# Patient Record
Sex: FEMALE | Race: WHITE
[De-identification: names, ages, dates, MRNs, and addresses within clinical notes are randomized per-mention and may not be internally consistent; named-entity substitution may affect disease eponyms.]

---

## 2017-02-20 ENCOUNTER — HOSPITAL ENCOUNTER (OUTPATIENT)
Dept: HOSPITAL 45 - C.RDSM | Age: 52
Discharge: HOME | End: 2017-02-20
Attending: ORTHOPAEDIC SURGERY
Payer: OTHER GOVERNMENT

## 2017-02-20 DIAGNOSIS — R52: Primary | ICD-10-CM

## 2017-02-20 NOTE — DIAGNOSTIC IMAGING REPORT
RIGHT HAND MIN 3 VIEWS



CLINICAL HISTORY: Right hand pain.     



COMPARISON: None.



DISCUSSION: The bony mineralization appears normal. No fractures are visualized.

There are tiny periarticular calcifications adjacent to the medial aspect of the

distal interphalangeal joint of the index finger. There is a tiny erosion

involving the base of the distal phalanx of the index finger.



IMPRESSION: 

1. No acute fractures

2. Tiny periarticular calcifications at the level the distal interphalangeal

joint of the index finger.







Electronically signed by:  Isaac Bryant M.D.

2/20/2017 11:06 AM



Dictated Date/Time:  2/20/2017 11:05 AM

## 2017-02-21 NOTE — DIAGNOSTIC IMAGING REPORT
LEFT HAND MIN 3 VIEWS



CLINICAL HISTORY: Left hand pain     



COMPARISON: None.



DISCUSSION: 3 views reveal normal mineralization for age. No fractures or

dislocations are visualized. There are no erosive or destructive changes.

Minimal degenerative changes are evident.    



IMPRESSION: Minimal degenerative change. Normal study for age.







Electronically signed by:  Isaac Bryant M.D.

2/21/2017 1:13 PM



Dictated Date/Time:  2/21/2017 1:12 PM

## 2017-03-21 ENCOUNTER — HOSPITAL ENCOUNTER (OUTPATIENT)
Dept: HOSPITAL 45 - X.SURG | Age: 52
Discharge: HOME | End: 2017-03-21
Attending: ORTHOPAEDIC SURGERY
Payer: OTHER GOVERNMENT

## 2017-03-21 VITALS
BODY MASS INDEX: 30.38 KG/M2 | BODY MASS INDEX: 30.38 KG/M2 | HEIGHT: 67.99 IN | WEIGHT: 200.42 LBS | WEIGHT: 200.42 LBS | HEIGHT: 67.99 IN

## 2017-03-21 VITALS — SYSTOLIC BLOOD PRESSURE: 123 MMHG | HEART RATE: 67 BPM | DIASTOLIC BLOOD PRESSURE: 80 MMHG | OXYGEN SATURATION: 98 %

## 2017-03-21 VITALS — TEMPERATURE: 97.88 F

## 2017-03-21 DIAGNOSIS — Z88.0: ICD-10-CM

## 2017-03-21 DIAGNOSIS — M65.312: Primary | ICD-10-CM

## 2017-03-21 NOTE — ANESTHESIA PROGRESS NT - [facility]
Anesthesia Post Op Note


Date & Time


Mar 21, 2017 at 08:07





Vital Signs


Pain Intensity:  4





 Vital Signs Past 12 Hours








  Date Time  Temp Pulse Resp B/P Pulse Ox O2 Delivery O2 Flow Rate FiO2


 


3/21/17 06:37 36.8 79 18 138/90 98 Room Air  











Notes


Mental Status:  alert / awake / arousable, participated in evaluation


Pt Amnestic to Procedure:  Yes


Nausea / Vomiting:  adequately controlled


Pain:  adequately controlled


Airway Patency, RR, SpO2:  stable & adequate


BP & HR:  stable & adequate


Hydration State:  stable & adequate


Anesthetic Complications:  no major complications apparent

## 2017-03-21 NOTE — DISCHARGE INSTRUCTIONS
Discharge Instructions


Date of Service


Mar 21, 2017.





Visit


Reason for Visit:  Left Trigger Thumb





Discharge


Discharge Diagnosis / Problem:  same





Discharge Goals


Goal(s):  Decrease discomfort, Improve function





Medications


Stopped Medications Name(s):  


na


Restart Stopped Medication(s):


resume all scripts





Activity Recommendations


Activity Limitations:  as noted below


Lifting Limitations:  until after follow-up appointment


Exercise/Sports Limitations:  until after follow-up appointment


May Resume Sexual Activity:  when tolerated


Shower/Bathe:  keep incision dry


Driving or Machine Use:  no limitations





Anesthesia


.





Post Anesthesia Instructions:





If you have had General Anesthesia or IV Sedation:





*  Do not drive today.


*  Resume driving when surgeon permits.


*  Do not make important decisions or sign legal documents today.


*  Call surgeon for:





   1.  Temperature elevations greater than 101 degrees F.


   2.  Uncontrollable pain.


   3.  Excessive bleeding.


   4.  Persistent nausea and vomiting.


   5.  Medication intolerance (nausea, vomiting or rash).





*  For nausea and vomiting use only clear liquids such as: tea, soda, bouillon 

until nausea subsides, then gradually increase diet as tolerated.





*  If you have any concerns or questions, call your surgeon's office.  If 

physician is unavailable and it is an emergency, call 911 or go to the nearest 

emergency room.





.





Instructions / Follow-Up


Instructions / Follow-Up


The following are instructions to follow after minor hand surgery.





ACTIVITY RECOMMENDATIONS:





*  Minimize activity until your first visit after surgery.





*  No excessive walking, jogging, sports or laboring.





*  Return to activity is individualized.  Most patients are able to return to 

everyday activities


   within 2 weeks.





*  Return to sports or intensive labor usually occurs at 1-2 months.





*  DRIVING:  Driving may be resumed when you feel you have adequate pain 

control and 


   use of the hand.





*  BATHING:  You may shower or sponge-bathe immediately after surgery.  The 

dressing


   will need to be covered with a plastic bag or plastic wrap until the 

dressing is changed 


   on the fourth or fifth day after surgery.  Once the dressing has been 

changed on the


   fourth or fifth day after surgery, you may shower and get the incision wet.  





*  Wash with regular soap and water.  





*  Do not bathe (submerge the incision), soak, swim or use a hot tub until the 

incision is 


   completely healed over with normal skin and the doctor has given the OK to 

proceed.





*  There is no need to apply any ointments, powders or salves to your incision.





*  Do not apply alcohol or hydrogen peroxide directly to the incision.  Diluted 

peroxide


   (50:50 mixture with sterile saline) may be used to clean dried blood from 

around the incision


   area.








WORK/SCHOOL:





*  You may return to sedentary work or school when you are feeling comfortable.

  This is usually


   3-7 days after surgery.





*  Expect increased discomfort with increased activity.  Continue to elevate 

and ice the hand as 


   much as possible.








DIET:





*  Resume previous diet.








MEDICATIONS:





*  You will have a prescription for pain medication and an anti-inflammatory 

medication after surgery.


   Use the pain pills for severe pain and the anti-inflammatory for less severe 

pain.





*  Once the pain pills have run out, try to use the anti-inflammatory.  If this 

is not effective then 


   contact the office [phone] for assistance.





*  The pain medication may cause nausea, constipation and sleepiness.  You 

should see how 


   they affect you before driving or similar activity.





*  The anti-inflammatory may cause stomach upset and bleeding.  If this occurs, 

let your doctor 


    know immediately [phone].





*  Some patients may need blood clot prevention.  This can be done with either 

a pill or a simple


   shot.  Your doctor will advise you on when to begin these medications and 

how to take them.





*  Do not take aspirin or other anti-inflammatory products (i.e. Advil or Aleve

) if taking blood 


   thinner medication.





*  Take a stool softener like Colace or a stimulant like Senokot to prevent 

constipation.








SPECIAL CARE INSTRUCTIONS:





ICE:





*  Do not apply ice directly to the skin. 





*  Use a thin dressing or stockinet between the skin and ice bag.  The dressing 

in place 


   after surgery will suffice.





*  Apply ice for 20-30 minutes and repeat every 2-4 hours.  This is especially 

important for 


   the first 3-7 days after surgery.





*  Once the pain improves, use ice as needed.





ELEVATION:





*  Keep your hand elevated at or above the level of your heart as much as 

possible.





*  Expect some increased discomfort and swelling if you allow your hand to hang 

down for


   any length of time.





DRESSING:





*  Your dressing will be changed 4-5 days after surgery by the physical 

therapist or physician's


   assistant.  Leave your dressing intact until this time.





*  You may then change your dressing daily with clean dry gauze or Band-aids 

and a soft wrap


   or stockinet.





*  Always wash your hands prior to touching the incision area.





*  Once the stitches are removed, you may leave the wound open to air or cover 

with a thin bandage.





*  There is no need to apply any ointments, powders or salves to your incision.





*  Expect some bloody drainage for the first few days after surgery.





*  Leave the tape strips in place (if present) for 5-7 days.  





*  The initial dressing after surgery may become soaked with blood or fluid 

which is normal.  


   You may reinforce your dressing with clean, dry gauze as needed.





BRACE:





*  Bracing is generally not needed after routine hand surgery.





THERAPY:





*  Physical therapy may be prescribed after your surgery.





*  For carpal tunnel and trigger digit surgery you may begin moving your 

fingers and wrist


   immediately after surgery as tolerated.





*  Be careful to not overuse.





*  Once the sutures are removed, further range of motion exercises can be 

performed.





*  Hand incisions may be very sensitive for a few months after surgery so avoid 

excessive 


   pressure on the incision.  If necessary, use a padded weightlifters' glove.





*  You may massage the incision with skin cream to make it less sensitive and 

reduce scarring.





*  Hand strength usually returns with normal use.





*  If needed, squeezing a soft sponge or Play-dough may help.





*  Your doctor will recommend physical therapy if necessary.








PROBLEMS/QUESTIONS:





*  If you have any problems such as severe pain, numbness, tingling or high 

fevers or if you 


   have any questions, please contact the office at [phone].





*  It is not uncommon to have some numbness and tingling after the surgery 

especially if you


   have had a nerve block done.  This should gradually improve over the first 1-

2 days.  If this 


   persists longer or worsens then contact the office.








FOLLOW UP VISIT:





*  If not already scheduled, please call the office at [phone] to 

schedule follow-up


   appointments for approximately 10 days, 6 weeks and 3 months after surgery.





Diet Recommendations


Recommended Home Diet:  resume previous diet





Procedures


Procedures Performed:  


left trigger release





Pending Studies


Studies pending at discharge:  no





Medical Emergencies








.


Who to Call and When:





Medical Emergencies:  If at any time you feel your situation is an emergency, 

please call 911 immediately.





.





Non-Emergent Contact


Non-Emergency issues call your:  Specialist


Call Non-Emergent contact if:  temperature is above 101.5





.


.





"Provider Documentation" section prepared by Cameron Martinez.

## 2017-03-21 NOTE — OPERATIVE REPORT
DATE OF OPERATION:  03/21/2017

 

SURGEON:  Dr. Cameron Martinez.

 

ASSISTANT:  Zachariah López PA-C.  No resident or fellow available.

 

PREOPERATIVE DIAGNOSIS:  Left trigger thumb.

 

POSTOPERATIVE DIAGNOSIS:  Same.

 

OPERATION PERFORMED:  Left trigger thumb release.

 

PERIOPERATIVE SITUATION:  Medically cleared female with intractable thumb

triggering bilaterally, left and right, wants to proceed with a left first.

 

OPERATION:  The patient was appropriately identified, site verified, consent

verified, and 2 grams of Ancef confirmed as being given.  The left upper

extremity was injected with 3 mL of 0.5% Marcaine plain and 3 mL of 2% plain

lidocaine in the area of the MCP flexion crease of the left thumb.  The arm

was then prepped and draped in the usual routine fashion with a forearm

tourniquet.  The tourniquet was inflated to 250 mmHg after exsanguination of

limb with a rubber Esmarch bandage for a total of 10 minutes.  A curvilinear

incision was made based over the MCP joint.  Full thickness flaps raised. 

Care was taken to protect the digital nerves, which were identified, but not

dissected cleanly.  The area of the mass of the flexor cyst was identified. 

This area was then dissected with the [place] elevator and fully identified

from proximal to distal and then released.  There was some fraying of the

tendon, which was debrided.  The floor of the canal of the flexor sheath had

no masses.  The patient was then asked to move her finger through a full

range of motion and there was no catching.  The wound was then irrigated and

closed with horizontal 3-0 nylon mattress sutures, Dermabond and a light

dressing.  The patient was then transferred to [facility] area in satisfactory

condition having tolerated the procedure well.

 

ESTIMATED BLOOD LOSS:  Trace.

 

CRYSTALLOID:  Per anesthesia report.

 

 

I attest to the content of the Intraoperative Record and any orders documented 
therein. Any exceptions are noted below.

 

 

 

MTDD

## 2017-03-21 NOTE — OPERATIVE REPORT
PREOPERATIVE DIAGNOSIS:  Left thumb trigger digit.

 

POSTOPERATIVE DIAGNOSIS:  Left thumb same.

 

PROCEDURE:  Left thumb open trigger digit release.

 

SURGEON:  Dr. Martinez.  

 

ASSISTANT:  Zachariah López PA-C.

 

HISTORY OF PRESENT ILLNESS:  This 52-year-old white female presented to the

office with complaints of triggering and pain in her left thumb.  She had

tried conservative care measures without success.  She elected to proceed

with surgical intervention after being educated about potential risks and

outcomes.

 

OPERATION:  The patient was taken to the operating room where she was given

local anesthetic and sedation.  She was prepped and draped in the usual

sterile fashion.  Please see Dr. Martinez's operative report for

specifics of the procedure.  I was present for the entire case from initial

patient positioning through final wound closure.  Assistance was provided in

tissue retraction, hemostasis, and final wound closure.  The patient was

taken to phase 2 recovery in satisfactory condition.

 

 

 

CORIE

## 2017-03-21 NOTE — [facility] POST OPERATIVE BRIEF NOTE
Immediate Operative Summary


Operative Date


Mar 21, 2017.





Pre-Operative Diagnosis





Left Trigger Thumb





Post-Operative Diagnosis





Same





Procedure(s) Performed





Left Thumb Open A1 Pulley Release





Surgeon


Dr Martinez





Assistant Surgeon(s)


TRI López PA-C





Estimated Blood Loss


Trace





Findings


trigger with tendon fraying





Fluids (cc crystalloids)


see anesthesia report





Specimens





None





Drains


none





Anesthesia


local/sedation





Complication(s)


None





Disposition


Recovery Room / PACU

## 2017-05-09 ENCOUNTER — HOSPITAL ENCOUNTER (OUTPATIENT)
Dept: HOSPITAL 45 - X.SURG | Age: 52
Discharge: HOME | End: 2017-05-09
Attending: ORTHOPAEDIC SURGERY
Payer: OTHER GOVERNMENT

## 2017-05-09 VITALS
WEIGHT: 200.42 LBS | BODY MASS INDEX: 30.38 KG/M2 | HEIGHT: 67.99 IN | HEIGHT: 67.99 IN | BODY MASS INDEX: 30.38 KG/M2 | WEIGHT: 200.42 LBS

## 2017-05-09 VITALS — TEMPERATURE: 97.88 F

## 2017-05-09 VITALS — SYSTOLIC BLOOD PRESSURE: 133 MMHG | OXYGEN SATURATION: 100 % | DIASTOLIC BLOOD PRESSURE: 82 MMHG | HEART RATE: 70 BPM

## 2017-05-09 DIAGNOSIS — E66.9: ICD-10-CM

## 2017-05-09 DIAGNOSIS — E78.00: ICD-10-CM

## 2017-05-09 DIAGNOSIS — M19.90: ICD-10-CM

## 2017-05-09 DIAGNOSIS — M65.311: Primary | ICD-10-CM

## 2017-05-09 NOTE — OPERATIVE REPORT
DATE OF OPERATION:  05/09/2017

 

SURGEON:  Dr. Martinez.

 

ASSISTANT:  Zachariah López PA-C.  No resident or fellow available.

 

PREOPERATIVE DIAGNOSIS:  Trigger thumb right.

 

POSTOPERATIVE DIAGNOSIS:  Same.

 

OPERATION PERFORMED:  Right trigger thumb release.

 

ANESTHESIA:  Local by surgeon; sedation by anesthesia.

 

PERIOPERATIVE SITUATION:  Medically cleared female with intractable thumb

locking and catching.  Has some degenerative disease throughout the hand. 

This will not address that.  She understands this is just for her trigger

thumb.

 

PROCEDURE:  The patient appropriately identified, site verified, consent

verified, 2 grams of Ancef confirmed as being given.  The area was injected

with 4 mL of 0.5% plain Marcaine and 4 mL of 2% plain lidocaine.  The arm was

then prepped in usual routine fashion.  Tourniquet inflated to 250 mmHg after

exsanguination of limb with a rubber Esmarch bandage for a total of

approximately 10 minutes.  An incision was then made based at the MCP joint. 

Flaps raised.  Care taken to protect the digital nerves to the thumb.  The

flexor mechanism identified and the pulley identified proximally.  It was

nicked and then split with the scissors up into the most proximal aspect of

the oblique pulley.  The tendon was then noted to have some fraying; this was

debrided, it was very superficial.  Tendon had good continuity.  Patient had

good active extension and flexion of the thumb under command.  The wound was

then irrigated and then closed with horizontal and simple mattress 4-0 nylon

suture, Dermabond and a light dressing.

 

ESTIMATED BLOOD LOSS:  Trace.

 

CRYSTALLOID:  Per anesthesia.  

 

No DVT prophylaxis required.

 

 

I attest to the content of the Intraoperative Record and any orders documented therein. Any exceptio
ns are noted below.

## 2017-05-09 NOTE — OPERATIVE REPORT
PREOPERATIVE DIAGNOSIS:  Right trigger thumb.

 

POSTOPERATIVE DIAGNOSIS:  Right thumb same.

 

PROCEDURE:  Right open trigger thumb release.

 

SURGEON:  Dr. Martinez.

 

ASSISTANT:  Zachariah López PA-C.

 

HISTORY OF PRESENT ILLNESS:  This 52-year-old white female presented to the

office with complaints of right thumb triggering, locking, and catching.  She

had tried conservative care measures without success.  She elected to proceed

with surgical intervention after being educated about potential risks and

outcomes.  She previously had a successful trigger thumb release on the left.

 

OPERATION:  The patient was taken to the operating room where she was given

local anesthetic and sedation.  She was prepped and draped in the usual

sterile fashion.  Please see Dr. Martinez's operative report for

specifics of the procedure.  I was present for the entire case from initial

patient positioning through final wound closure.  Assistance was provided in

patient position, tissue retraction, final wound closure.  The patient was

taken to the recovery room in satisfactory condition.

## 2017-05-09 NOTE — DISCHARGE INSTRUCTIONS
Discharge Instructions


Date of Service


May 9, 2017.





Visit


Reason for Visit:  Right Trigger Thumb





Discharge


Discharge Diagnosis / Problem:  same





Discharge Goals


Goal(s):  Decrease discomfort, Improve function





Medications


Stopped Medications Name(s):  


na


Restart Stopped Medication(s):


resume all meds as directed by scripts





Activity Recommendations


Activity Limitations:  as noted below


Lifting Limitations:  until after follow-up appointment


Exercise/Sports Limitations:  until after follow-up appointment


May Resume Sexual Activity:  when tolerated


Shower/Bathe:  keep incision dry


Driving or Machine Use:  no limitations





Anesthesia


.





Post Anesthesia Instructions:





If you have had General Anesthesia or IV Sedation:





*  Do not drive today.


*  Resume driving when surgeon permits.


*  Do not make important decisions or sign legal documents today.


*  Call surgeon for:





   1.  Temperature elevations greater than 101 degrees F.


   2.  Uncontrollable pain.


   3.  Excessive bleeding.


   4.  Persistent nausea and vomiting.


   5.  Medication intolerance (nausea, vomiting or rash).





*  For nausea and vomiting use only clear liquids such as: tea, soda, bouillon 

until nausea subsides, then gradually increase diet as tolerated.





*  If you have any concerns or questions, call your surgeon's office.  If 

physician is unavailable and it is an emergency, call 911 or go to the nearest 

emergency room.





.





Diet Recommendations


Recommended Home Diet:  resume previous diet





Procedures


Procedures Performed:  


right trigger thumb release





Pending Studies


Studies pending at discharge:  no





Medical Emergencies








.


Who to Call and When:





Medical Emergencies:  If at any time you feel your situation is an emergency, 

please call 911 immediately.





.





Non-Emergent Contact


Non-Emergency issues call your:  Specialist


Call Non-Emergent contact if:  temperature is above 101.5





.


.





"Provider Documentation" section prepared by Cameron Martinez.








.

## 2017-05-09 NOTE — [facility] POST OPERATIVE BRIEF NOTE
Immediate Operative Summary


Operative Date


May 9, 2017.





Pre-Operative Diagnosis





Right Trigger Thumb





Post-Operative Diagnosis





Same





Procedure(s) Performed





Right Trigger Thumb Release





Surgeon


Dr. Martinez





Assistant Surgeon(s)


TRI López PA-C





Estimated Blood Loss


Trace





Findings


trigger/tendon fraying





Fluids (cc crystalloids)


see anesthesia report





Specimens





None





Drains


none





Anesthesia


local/sedation





Complication(s)


None





Disposition


Recovery Room / PACU

## 2017-05-09 NOTE — ANESTHESIA PROGRESS NT - [facility]
Anesthesia Post Op Note


Date & Time


May 9, 2017 at 07:47





Vital Signs


Pain Intensity:  0





 Vital Signs Past 12 Hours








  Date Time  Temp Pulse Resp B/P Pulse Ox O2 Delivery O2 Flow Rate FiO2


 


5/9/17 07:22 36.6 75 18 109/71 96 Room Air  


 


5/9/17 06:27 37.1 79 20 134/87 97 Room Air  











Notes


Mental Status:  alert / awake / arousable, participated in evaluation


Pt Amnestic to Procedure:  Yes


Nausea / Vomiting:  adequately controlled


Pain:  adequately controlled


Airway Patency, RR, SpO2:  stable & adequate


BP & HR:  stable & adequate


Hydration State:  stable & adequate


Anesthetic Complications:  no major complications apparent

## 2017-06-05 ENCOUNTER — HOSPITAL ENCOUNTER (OUTPATIENT)
Dept: HOSPITAL 45 - C.OPENMRI | Age: 52
Discharge: HOME | End: 2017-06-05
Attending: ORTHOPAEDIC SURGERY
Payer: COMMERCIAL

## 2017-06-05 DIAGNOSIS — R60.0: ICD-10-CM

## 2017-06-05 DIAGNOSIS — R93.7: ICD-10-CM

## 2017-06-05 DIAGNOSIS — M25.462: ICD-10-CM

## 2017-06-05 DIAGNOSIS — M17.12: Primary | ICD-10-CM

## 2017-06-05 NOTE — DIAGNOSTIC IMAGING REPORT
MRI OF THE  LEFT KNEE



CLINICAL HISTORY:  Chronic left knee pain. Osteoarthritis.



COMPARISON STUDY: Radiographs of the left knee dated 12/19/2016.



TECHNIQUE: MRI of the left knee was performed utilizing proton density, T1, and

T2-weighted sequences in the axial, sagittal, coronal planes. IV contrast was

not administered for this examination. The examination is significantly degraded

by open MRI technique.



FINDINGS:



Menisci: There is increased signal identified within the posterior horn of both

the medial and lateral menisci, greatest in the medial meniscus. This does not

clearly extend to the articular surface, and this could represent mucoid

degeneration versus intrasubstance tear.



Ligaments: The anterior and posterior cruciate ligaments are intact. The medial

and lateral collateral ligaments are within normal limits.



Extensor mechanism: The extensor mechanism is intact. Hoffa's fat pad is normal

in appearance. There is increased signal within the suprapatellar fat pad, best

seen on sagittal T2 fat-sat image #13.



Articular cartilage and bone: There is mild chondromalacia patella, with

approximately 50% thinning of the articular cartilage at the patellar apex and

along the lateral facet. Foci of greater than 50% fissuring are observed. No

full-thickness cartilage loss is seen and there is no reactive marrow edema.

There is approximately 50% thinning of the articular cartilage along the

weightbearing surface in the medial compartment. Particular cartilage of the

lateral compartment appears maintained. There are tiny marginal osteophytes.

There is no MRI evidence of fracture.



Joint effusion: There is a moderate to large joint effusion.



Soft tissues: Prepatellar soft tissue edema is noted. The musculature

surrounding the knee joint is normal in bulk and signal intensity.





IMPRESSION: 



1. Moderate to large joint effusion.



2. Mild chondromalacia patella. There is also mild degenerative thinning of the

articular cartilage along the weightbearing surface in the medial compartment.

No full-thickness cartilage loss or reactive marrow edema is identified.



3. There is increased signal identified within the posterior horns of the

menisci, medial greater than lateral. This does not clearly extend to the

articular surface, and could represent mucoid degeneration versus intrasubstance

tearing. 



4. There is nonspecific edema within the suprapatellar fat pad. This could be

seen in setting of the quadriceps fat pad impingement syndrome. Clinical

correlation will be required. 



5. Prepatellar soft tissue edema.



6. The collateral ligaments and the cruciate ligaments are maintained.







Electronically signed by:  Armando Singleton M.D.

6/5/2017 10:12 AM



Dictated Date/Time:  6/5/2017 10:03 AM

## 2017-08-01 ENCOUNTER — HOSPITAL ENCOUNTER (OUTPATIENT)
Dept: HOSPITAL 45 - X.SURG | Age: 52
Discharge: HOME | End: 2017-08-01
Attending: ORTHOPAEDIC SURGERY
Payer: COMMERCIAL

## 2017-08-01 VITALS — DIASTOLIC BLOOD PRESSURE: 89 MMHG | OXYGEN SATURATION: 100 % | SYSTOLIC BLOOD PRESSURE: 147 MMHG | HEART RATE: 70 BPM

## 2017-08-01 VITALS
BODY MASS INDEX: 30.67 KG/M2 | HEIGHT: 67.01 IN | BODY MASS INDEX: 30.67 KG/M2 | WEIGHT: 195.42 LBS | WEIGHT: 195.42 LBS | HEIGHT: 67.01 IN

## 2017-08-01 VITALS — TEMPERATURE: 97.88 F

## 2017-08-01 DIAGNOSIS — E78.00: ICD-10-CM

## 2017-08-01 DIAGNOSIS — Z87.891: ICD-10-CM

## 2017-08-01 DIAGNOSIS — M25.362: ICD-10-CM

## 2017-08-01 DIAGNOSIS — F41.9: ICD-10-CM

## 2017-08-01 DIAGNOSIS — M65.862: ICD-10-CM

## 2017-08-01 DIAGNOSIS — Z79.899: ICD-10-CM

## 2017-08-01 DIAGNOSIS — E66.9: ICD-10-CM

## 2017-08-01 DIAGNOSIS — M17.12: Primary | ICD-10-CM

## 2017-08-01 NOTE — [facility] POST OPERATIVE BRIEF NOTE
Immediate Operative Summary


Operative Date


Aug 1, 2017.





Pre-Operative Diagnosis





Left Knee Degenerative Medial Meniscus Tear





Post-Operative Diagnosis





djd PFJ/loose body/medial compartment djd





Procedure(s) Performed





EUA,Left Knee Arthroscopy,  Debridement PFL removal loose body





Surgeon


Dr Martinez





Assistant Surgeon(s)


TRI López PA-C





Estimated Blood Loss


Trace





Findings


see op note





Fluids (cc crystalloids)


600cc





Specimens





None





Drains


none





Anesthesia


LMA/IA block





Complication(s)


None





Disposition


Recovery Room / PACU

## 2017-08-01 NOTE — HISTORY & PHYSICAL BRIDGE NOTE
H&P Re-Evaluation


Bridge Note:


I have examined the patient, reviewed the History & Physical and in the 

interval since the performance of the History & Physical I have noted the 

following changes of clinical significance: consent obtained,No changes noted

## 2017-08-01 NOTE — [facility] OPERATIVE REPORT
Operative Report


Operative Date


Aug 1, 2017.





Pre-Operative Diagnosis





Left Knee Degenerative Medial Meniscus Tear





Post-Operative Diagnosis





djd PFJ/loose body/medial compartment djd





Procedure(s) Performed





EUA,Left Knee Arthroscopy,  Debridement PFJ and medial compartment,


removal loose body





Surgeon


Dr Martinez





Assistant Surgeon(s)


TRI López PA-C





Estimated Blood Loss


Trace





Findings


Degenerative joint disease, loose body





Fluids (cc crystalloids)


600cc





Specimens





None





Drains


none





Complication(s)


None





Indications


This 52-year-old white female presented the office with complaints of left knee 

pain that was affecting her ADLs.  She elected to proceed with surgical 

intervention after being educated about potential risks and outcomes.  

Preoperative MRI was obtained.





Description of Procedure


Patient was taken to the operating room where she was given general anesthesia.

  She was prepped and draped in usual sterile fashion.  Please see Dr. Martinez's operative report for specifics of the procedure.  I was present 

for the entire case from initial patient positioning through final wound 

closure.  Assistance was provided in patient positioning, arthroscopy, and 

final wound closure.  Patient was taken to the recovery room in satisfactory 

condition.


I attest to the content of the Intraoperative Record and any orders documented 

therein.  Any exceptions are noted below.

## 2017-08-01 NOTE — DISCHARGE INSTRUCTIONS
Discharge Instructions


Date of Service


Aug 1, 2017.





Visit


Reason for Visit:  Left Knee Degenerative Medial Meniscus Tear





Discharge


Discharge Diagnosis / Problem:  same





Discharge Goals


Goal(s):  Decrease discomfort, Improve function





Medications


Stopped Medications Name(s):  


na


Restart Stopped Medication(s):


use all med scripts as directed





Activity Recommendations


Activity Limitations:  as noted below


Lifting Limitations:  gradually increase as tolerated


Exercise/Sports Limitations:  until after follow-up appointment


May Resume Sexual Activity:  when tolerated


Shower/Bathe:  keep incision dry


Driving or Machine Use:  resume 1 day after discharge


Weightbearing Status:  Left weightbearing (as tolerated)





Anesthesia


.





Post Anesthesia Instructions:





If you have had General Anesthesia or IV Sedation:





*  Do not drive today.


*  Resume driving when surgeon permits.


*  Do not make important decisions or sign legal documents today.


*  Call surgeon for:





   1.  Temperature elevations greater than 101 degrees F.


   2.  Uncontrollable pain.


   3.  Excessive bleeding.


   4.  Persistent nausea and vomiting.


   5.  Medication intolerance (nausea, vomiting or rash).





*  For nausea and vomiting use only clear liquids such as: tea, soda, bouillon 

until nausea subsides, then gradually increase diet as tolerated.





*  If you have any concerns or questions, call your surgeon's office.  If 

physician is unavailable and it is an emergency, call 911 or go to the nearest 

emergency room.





.





Instructions / Follow-Up


Instructions / Follow-Up


The following are instructions to follow after  your Arthroscopic Knee Surgery.





ACTIVITY RECOMMENDATIONS:





*  Minimize activity until your first visit after surgery.





*  No excessive walking, jogging, sports or laboring.





*  Return to activity is individualized.  Most patients are able to return to 

every day activities


   within one month.





*  Return to sports or intensive labor usually occurs at 2-3 months.





*  Driving is not permitted until at least your first postoperative visit at a 

minimum.  Please 


   ask your doctor when it is safe to resume driving.  If you have an automatic 

vehicle and 


   your left leg has been operated on, then you may begin driving as soon as 

you are 


   comfortable and can drive safely.








SCHOOL/WORK RECOMMENDATIONS:





*  You may return to sedentary work or school when you are feeling more 

comfortable.  This


    is usually 3-7 days after surgery.  





*  Expect increased discomfort with increased activity.  Continue to elevate 

and ice the leg 


   as much as possible.








MEDICATIONS:





*  You will have a prescription for pain medication and an anti-inflammatory 

medication after surgery.





*  Use the pain medication for severe pain and the anti-inflammatory for less 

severe pain.  Once the 


   pain medication has run out, try to use the anti-inflammatory medication.  

If this is not effective, 


   contact the office [phone] for assistance.





*  The pain medication may cause nausea, constipation and drowsiness.  You 

should see how they 


   affect you before driving or similar activity.





*  The anti-inflammatory medication may cause stomach upset and bleeding.  If 

this occurs let your 


   doctor know immediately [phone].  





*  Take a stool softener like Colace or a laxative like Senokot to prevent 

constipation.








DIET:





*  Resume previous diet.








SPECIAL CARE:





ICE:  You have the option of an ice cooler, gel packs or ice bags. 





*   If you have an ice cooler, refer to the instructions for that device.  The 

ice cooler may 


    be used continuously.





*  If you do not have an ice cooler, you will need to use ice bags or gel 

packs.  Do not 


   apply ice directly to the skin.  Use a thin dressing or jeremy shirt between 

the skin and ice


   bag.  Apply ice for 20-30 minutes and repeat every 2-4 hours.  This is 

especially important


   for the first 7-10 days after surgery.  Once the pain improves, use ice as 

needed.  





ELEVATION:





*  Keep your leg elevated at or above the level of your heart as much as 

possible.





*  Expect some increased discomfort and swelling if you are standing for any 

length of time.





*  When lying down, avoid placing anything under your knee.  Rather, prop your 

leg up by placing


   several pillows under your heel or calf.





DRESSING:





*  Your dressing will be changed at your first therapy appointment 

approximately 4-5 days after 


   surgery.  Band-aids, tape strips or gauze may be applied.  You may then 

change your dressing


   daily.





*  Reapply dressing followed by the Ace wrap or Tubi- stockinet and EBIce 

cooling pad (if chosen).  





*  Always wash your hands prior to touching the incision area.





*  Once the stitches are removed, you may leave the wound open to air or cover 

with an Ace wrap 


   or Tubi- stockinet.





*  If you have been given a white elastic stocking (JAN hose), wear as much as 

possible for the first


   1-3 weeks depending on swelling.





*  Expect some bloody drainage for the first few days after surgery.





*  Leave the tape strips, if present, in place for 5-7 days.





*  Band-aids and gauze may be changed daily.





CRUTCHES:





*  You will need to use crutches after surgery.





*  You may gradually progress to full weight bearing as tolerated and wean off 

the crutches


   unless otherwise advised.





*  Your therapist can provide assistance weaning off crutches.





*  Patients who have a microfracture done may need to be toe-touch weight-

bearing for 


   4-6 weeks.





BATHING:





*  You may shower or sponge-bathe immediately after surgery.  





*  The dressing will need to be covered with a plastic bag or plastic wrap 

until the dressing


    is changed on the fourth or fifth day after surgery. 





*  Once the dressing has been changed on the fourth or fifth day after surgery, 

you may 


   shower and get the incision wet.





*  Wash with regular soap and water.  





*  Do not bathe (submerge the incision), soak, swim or use a hot tub until the 

incision is 


   completely healed over with normal skin and the doctor has given the OK to 

proceed.





*  There is no need to apply any ointments, powders or salves to your incision.





*  Do not apply alcohol or hydrogen peroxide directly to the incision.





*  Diluted peroxide (50:50 mixture with sterile saline) may be used to clean 

dried blood from


   around the incision area.





BRACE:





*  Bracing is generally not needed after routine Arthroscopic Knee surgery.





THERAPY:





*  You will begin therapy four or five days after surgery.  





*  Organized therapy with the therapist is important for the first 4-6 weeks 

after surgery.  


   During that time you will attend therapy 1-3 times per week.  





*  You will also need to do daily exercises for range of motion and strength as 

instructed.








PROBLEMS/QUESTIONS:





*  If you have any problems such as severe pain, numbness, tingling or high 

fevers or if you 


   have any questions, please contact the office at [phone].





*  It is not uncommon to have some numbness and tingling after the surgery 

especially if you


   have had a nerve block done.  This should gradually improve over the first 1-

2 days.  If this 


   persists longer or worsens please contact the office.








FOLLOW UP VISIT:





*  If not already scheduled, please call the office at [phone] to 

schedule a follow-up


   appointment for 10 days, 6 weeks and 3 months after surgery.





Diet Recommendations


Recommended Home Diet:  resume previous diet





Procedures


Procedures Performed:  


left knee scope debridement





Pending Studies


Studies pending at discharge:  no





Medical Emergencies








.


Who to Call and When:





Medical Emergencies:  If at any time you feel your situation is an emergency, 

please call 911 immediately.





.





Non-Emergent Contact


Non-Emergency issues call your:  Specialist


Call Non-Emergent contact if:  you have a fever, temperature is above 101.5, 

your pain is not controlled, wound has increased drainage, wound has increased 

redness, wound has increased pain, you have any medication questions





.


.








"Provider Documentation" section prepared by Cameron Martinez.








.

## 2017-08-01 NOTE — OPERATIVE REPORT
DATE OF OPERATION:  08/01/2017

 

SURGEON:  Dr. Martinez.

 

ASSISTANT:  Zachariah López PA-C.  No resident or fellow available.

 

PREOPERATIVE DIAGNOSIS:  Degenerative disease, left knee with possible

meniscus tear.

 

POSTOPERATIVE DIAGNOSIS:

1.  Degenerative patellofemoral joint with large articular flap unstable.

2.  Loose body from same site and medial compartment.

3.  Extensive synovitis.

4.  Grade 3 degenerative disease, medial compartment.

 

OPERATION PERFORMED:

1.  Exam under anesthesia.

2.  Diagnostic arthroscopy.

3.  Arthroscopic debridement of loose body, medial compartment.

4.  Incidental chondroplasty medial compartment.

5.  Extensive debridement with loose articular flap of trochlea and

patellofemoral joint synovial impingement grade 4 changes there.  

6.  Intact lateral compartment.

 

PERIOPERATIVE SITUATION:  Medically cleared female with intractable knee pain

has failed conservative management.  She was advised that this is likely an

extended degenerative process.  She states that she feels like something is

catching and locking and wants to proceed with surgical treatment.  Does not

wish to have the knee replacement.  Advised that this would not be a curative

procedure and that mechanical symptoms are eliminated and she is happy with

that.  That would be the most that we could be expected.

 

PROCEDURE:  The patient appropriately identified, site verified, consent

verified, 2 grams of Ancef confirmed as being given.  Left lower extremity

was examined revealing no ligamentous instability.  It was then sterilely

injected with 20 mL 0.5% Marcaine and 5 mg of Duramorph for postoperative

pain control.  The knee was then prepped and draped in usual routine fashion,

the inframedial and inferolateral portals identified and the inferolateral

was injected with 3 mL of 0.25% Marcaine with epinephrine as well as the

inframedial portal.  The joint was entered through the anterolateral

inferolateral portal and inspection of the anteromedial surface then allowed

needle localization and the medial portal made.  Immediately encountered was

a loose body in the medial compartment.  This was debrided with a shaver; it

was through articular cartilage.  Medial compartment revealed an intact

meniscus with grade 3 changes of the articular surfaces of the medial

compartment.  These were incidentally debrided.  There was synovial

impingement in both medial and lateral gutters and the patellofemoral joint. 

These were all debrided anteriorly and the osteophyte was developing off the

medial eminence.  The lateral compartment had intact lateral meniscus and

grade 1 changes of the articular surfaces.  The patellofemoral joint had a

very large defect in the trochlea with pseudo-osteonecrotic area with a large

articular flap that was impinging; this was debrided to a stable base.  There

was probably about 1 cm2 area.  This was then viewed from the opposite side

and debrided from the anterolateral portal to ensure the entire debridement

and flap stability was created.  Once this was done, the procedure was

terminated.  All instruments and fluid removed.  The portals closed with 4-0

nylon, dressed with Xeroform, 4 x 4 gauze, sterile Webril, ABD pads and above

knee JAN stocking.  The patient will be full weightbearing.  DVT prophylaxis

with Lovenox starting tomorrow.  Overall prognosis for this knee is guarded. 

High probability of knee replacement sometime in the next year to 2 years.

 

 

I attest to the content of the Intraoperative Record and any orders documented therein. Any exception
s are noted below.

## 2017-08-01 NOTE — ANESTHESIA PROGRESS NT - [facility]
Anesthesia Post Op Note


Date & Time


Aug 1, 2017 at 08:33





Vital Signs


Pain Intensity:  0





Vital Signs Past 12 Hours








  Date Time  Temp Pulse Resp B/P (MAP) Pulse Ox O2 Delivery O2 Flow Rate FiO2


 


8/1/17 08:17 36.6 70 16 150/92 (111) 100 Room Air  


 


8/1/17 08:11    135/84    


 


8/1/17 08:09  72 15  96   


 


8/1/17 08:09 37.2 69 20 135/84 99 Room Air  


 


8/1/17 08:09  73 15     


 


8/1/17 08:06    132/84    


 


8/1/17 08:04  70 15     


 


8/1/17 08:04  70 15  95   


 


8/1/17 08:01    143/87    


 


8/1/17 07:59  80 26     


 


8/1/17 07:59  83 26  100   


 


8/1/17 07:57    145/81    


 


8/1/17 07:54  80 17     


 


8/1/17 07:54  79 17  100   


 


8/1/17 07:51    137/82    


 


8/1/17 07:49  82 24  100   


 


8/1/17 07:49  83 24     


 


8/1/17 07:46    153/84    


 


8/1/17 07:44  84 19  100   


 


8/1/17 07:44  86 19     


 


8/1/17 07:42    151/66    


 


8/1/17 07:40    146/86    


 


8/1/17 07:39 36.8 86 16 146/86 100 Mask 6 


 


8/1/17 06:46 36.9 71 16 151/100 (117) 97 Room Air  











Notes


Mental Status:  alert / awake / arousable, participated in evaluation


Pt Amnestic to Procedure:  Yes


Nausea / Vomiting:  adequately controlled


Pain:  adequately controlled


Airway Patency, RR, SpO2:  stable & adequate


BP & HR:  stable & adequate


Hydration State:  stable & adequate


Anesthetic Complications:  no major complications apparent

## 2017-11-20 ENCOUNTER — HOSPITAL ENCOUNTER (OUTPATIENT)
Dept: HOSPITAL 45 - C.RDSM | Age: 52
Discharge: HOME | End: 2017-11-20
Attending: ORTHOPAEDIC SURGERY
Payer: COMMERCIAL

## 2017-11-20 DIAGNOSIS — M17.12: Primary | ICD-10-CM

## 2018-02-12 LAB
BASOPHILS # BLD: 0.04 K/UL (ref 0–0.2)
BASOPHILS NFR BLD: 0.9 %
BUN SERPL-MCNC: 17 MG/DL (ref 7–18)
CALCIUM SERPL-MCNC: 8.9 MG/DL (ref 8.5–10.1)
CO2 SERPL-SCNC: 29 MMOL/L (ref 21–32)
CREAT SERPL-MCNC: 0.74 MG/DL (ref 0.6–1.2)
EOS ABS #: 0.16 K/UL (ref 0–0.5)
EOSINOPHIL NFR BLD AUTO: 294 K/UL (ref 130–400)
GLUCOSE SERPL-MCNC: 90 MG/DL (ref 70–99)
HCT VFR BLD CALC: 34.2 % (ref 37–47)
HGB BLD-MCNC: 11.5 G/DL (ref 12–16)
IG#: 0.01 K/UL (ref 0–0.02)
IMM GRANULOCYTES NFR BLD AUTO: 29.5 %
INR PPP: 1 (ref 0.9–1.1)
LYMPHOCYTES # BLD: 1.38 K/UL (ref 1.2–3.4)
MCH RBC QN AUTO: 30.4 PG (ref 25–34)
MCHC RBC AUTO-ENTMCNC: 33.6 G/DL (ref 32–36)
MCV RBC AUTO: 90.5 FL (ref 80–100)
MONO ABS #: 0.62 K/UL (ref 0.11–0.59)
MONOCYTES NFR BLD: 13.2 %
NEUT ABS #: 2.47 K/UL (ref 1.4–6.5)
NEUTROPHILS # BLD AUTO: 3.4 %
NEUTROPHILS NFR BLD AUTO: 52.8 %
PMV BLD AUTO: 9.1 FL (ref 7.4–10.4)
POTASSIUM SERPL-SCNC: 4.6 MMOL/L (ref 3.5–5.1)
PTT PATIENT: 25.4 SECONDS (ref 21–31)
RED CELL DISTRIBUTION WIDTH CV: 13.1 % (ref 11.5–14.5)
RED CELL DISTRIBUTION WIDTH SD: 43 FL (ref 36.4–46.3)
SODIUM SERPL-SCNC: 139 MMOL/L (ref 136–145)
WBC # BLD AUTO: 4.68 K/UL (ref 4.8–10.8)

## 2018-02-12 NOTE — DIAGNOSTIC IMAGING REPORT
CHEST 2 VIEWS ROUTINE



CLINICAL HISTORY: 53 years-old Female presenting with preoperative assessment. 



TECHNIQUE: PA and lateral views of the chest were obtained.



COMPARISON: None.



FINDINGS:

Cardiomediastinal silhouette normal. Lungs and pleural spaces clear. Osseous

structures normal. Upper abdomen normal.



IMPRESSION:

1.  No acute cardiopulmonary disease.







Electronically signed by:  Lew Villagomez M.D.

2/12/2018 11:41 AM



Dictated Date/Time:  2/12/2018 11:41 AM

## 2018-02-12 NOTE — PAT MEDICATION INSTRUCTIONS
Service Date


Feb 12, 2018.





Current Home Medication List


Atorvastatin (Lipitor), 1 TAB PO QAM


Bupropion (Wellbutrin Sr), 150 MG PO BID


Duloxetine HCl (Cymbalta), 2 CAP PO QAM


Trazodone Hcl (Trazodone), 1.5 TAB PO HS





Medication Instructions


For Your Scheduled Surgery 








- Take the following medications the morning of surgery with a sip of water:


Duloxetine HCl (Cymbalta), 2 CAP PO QAM


Atorvastatin (Lipitor), 1 TAB PO QAM


Bupropion (Wellbutrin Sr), 150 MG PO BID





- Take the following medications as scheduled the night before surgery:


Trazodone Hcl (Trazodone), 1.5 TAB PO HS


Bupropion (Wellbutrin Sr), 150 MG PO BID








If you have any questions please call us at [phone] or [phone] or 

[phone]

## 2018-02-19 NOTE — HISTORY & PHYSICAL EXAMINATION
DATE OF ADMISSION:  02/27/2018

 

CHIEF COMPLAINT:  Left knee pain.

 

HISTORY OF PRESENT ILLNESS:  This 53-year-old white female presents to the

office with complaints of left knee pain that she has had for over a year and

a half.  Pain has become worse with time.  It is global throughout the knee. 

She does note occasional swelling.  Occasional catching.  No locking or

buckling.  No numbness or tingling.  Pain is worse with activity and does

affect her ADLs.  She previously tried anti-inflammatories as well as topical

anti-inflammatories, viscosupplementation, and previous knee arthroscopy with

debridement without lasting improvement.  She elects to proceed with surgical

intervention in hopes of alleviating her pain.  She does have known

tricompartmental arthritis from her knee arthroscopy performed 08/01/2017. 

Preoperative imaging has been obtained.  She elects to proceed with left

total knee arthroplasty in hopes of alleviating her pain.

 

PAST MEDICAL HISTORY:  Significant for elevated cholesterol, anxiety,

osteoarthritis, chronic low back pain, and obesity.

 

PREVIOUS SURGERIES:  Bilateral trigger thumb releases 2017, tubal ligation in

1993, pneumothorax, 1995 with chest tube placement.  Ovarian cyst excision

2013, left knee arthroscopy with partial medial meniscectomy and debridement

08/01/2017.

 

ALLERGIES:  KNOWN ALLERGY TO PENICILLIN WHICH CAUSED RANDOM JOINT SWELLING

AND RASH.  ALSO, LEVAQUIN, WHICH CAUSED HIVES.  SHE DOES FINE WITH ANCEF AND

KEFLEX.

 

FAMILY HISTORY:  Noncontributory.

 

CURRENT MEDICATIONS:  Cymbalta unknown dose, Lipitor 10 mg p.o. daily,

trazodone 50 mg p.o. at bedtime, Voltaren topical gel up to 4 times a day,

Wellbutrin daily unknown dose.

 

SOCIAL HISTORY:  The patient is currently unemployed.  Former cook.  No

tobacco use, quit in 2015.  Occasional ETOH use.  .

 

REVIEW OF SYSTEMS:  Significant for above stated conditions, otherwise

unremarkable.

 

PHYSICAL EXAMINATION:

GENERAL:  Well-developed, well-nourished middle aged white female in no acute

distress.  Sitting in a chair.  Alert and oriented.

SKIN:  Warm and dry with good turgor.  No rashes or lesions.  No ecchymosis

or erythema.  Mild intra-articular effusion in the left knee.

HEENT:  Normocephalic, atraumatic.  EYES:  PERRLA, EOMI.  Nares patent

bilaterally without turbinate enlargement.  Oropharynx without erythema or

exudate.  No lesions noted.  Uvula midline.  Oral mucosa moist.  Fair

dentition.

HEART:  RRR.  No MGR.

LUNGS:  Clear to auscultation bilaterally.  No crackles, rhonchi or wheezing.

 Good air movement.

ABDOMEN:  Bowel sounds present x4, soft, nontender.  No organomegaly.  Mild

obesity.

MUSCULOSKELETAL:  Left knee evaluation reveals a lack of around 5 degrees of

terminal extension.  Flexion to greater than 100 degrees.  Strength is 5/5

with fairly good quad tone.  No defect in the patellar tendon or quadriceps

tendon.  There is discomfort with palpation over the medial and lateral joint

lines.  She also has discomfort with palpation over the peripatellar area. 

Ambulatory with a minimally antalgic gait.

NEUROLOGIC:  Gross sensation is intact across the lower extremities by soft

touch.  Peripheral pulses are 2+.  Cranial nerves II-XII are intact.

 

DATA:  Arthroscopic imaging previously obtained shows end-stage disease in

all 3 compartments.  Radiographic imaging shows joint space narrowing and

periarticular osteophytes as well.

 

ASSESSMENT:  Left knee degenerative joint disease.  

 

PLAN:  Postoperative prescriptions for Percocet and Coumadin will be provided

at discharge from the hospital.  She will attend outpatient PT.  She already

has crutches.  Preoperative lab work, EKG, and chest x-ray have been ordered.

 Medical clearance has been obtained from her PCP in [place].  Informed

written consent will be obtained the morning of surgery.

## 2018-02-27 ENCOUNTER — HOSPITAL ENCOUNTER (INPATIENT)
Dept: HOSPITAL 45 - C.ACU | Age: 53
LOS: 1 days | Discharge: HOME | DRG: 470 | End: 2018-02-28
Attending: ORTHOPAEDIC SURGERY | Admitting: ORTHOPAEDIC SURGERY
Payer: COMMERCIAL

## 2018-02-27 VITALS
OXYGEN SATURATION: 98 % | HEART RATE: 73 BPM | DIASTOLIC BLOOD PRESSURE: 95 MMHG | SYSTOLIC BLOOD PRESSURE: 154 MMHG | TEMPERATURE: 98.06 F

## 2018-02-27 VITALS
DIASTOLIC BLOOD PRESSURE: 80 MMHG | SYSTOLIC BLOOD PRESSURE: 129 MMHG | HEART RATE: 70 BPM | OXYGEN SATURATION: 100 % | TEMPERATURE: 98.42 F

## 2018-02-27 VITALS — DIASTOLIC BLOOD PRESSURE: 90 MMHG | HEART RATE: 63 BPM | OXYGEN SATURATION: 100 % | SYSTOLIC BLOOD PRESSURE: 144 MMHG

## 2018-02-27 VITALS — DIASTOLIC BLOOD PRESSURE: 85 MMHG | OXYGEN SATURATION: 100 % | SYSTOLIC BLOOD PRESSURE: 123 MMHG | HEART RATE: 73 BPM

## 2018-02-27 VITALS — DIASTOLIC BLOOD PRESSURE: 66 MMHG | SYSTOLIC BLOOD PRESSURE: 104 MMHG | OXYGEN SATURATION: 100 % | HEART RATE: 78 BPM

## 2018-02-27 VITALS
DIASTOLIC BLOOD PRESSURE: 60 MMHG | SYSTOLIC BLOOD PRESSURE: 98 MMHG | OXYGEN SATURATION: 99 % | TEMPERATURE: 98.24 F | HEART RATE: 77 BPM

## 2018-02-27 VITALS
OXYGEN SATURATION: 97 % | SYSTOLIC BLOOD PRESSURE: 127 MMHG | TEMPERATURE: 98.6 F | HEART RATE: 75 BPM | DIASTOLIC BLOOD PRESSURE: 80 MMHG

## 2018-02-27 VITALS — OXYGEN SATURATION: 100 % | DIASTOLIC BLOOD PRESSURE: 74 MMHG | SYSTOLIC BLOOD PRESSURE: 110 MMHG | HEART RATE: 70 BPM

## 2018-02-27 VITALS
SYSTOLIC BLOOD PRESSURE: 134 MMHG | OXYGEN SATURATION: 100 % | HEART RATE: 75 BPM | TEMPERATURE: 97.88 F | DIASTOLIC BLOOD PRESSURE: 78 MMHG

## 2018-02-27 VITALS
WEIGHT: 205.25 LBS | WEIGHT: 205.25 LBS | BODY MASS INDEX: 32.21 KG/M2 | HEIGHT: 67 IN | BODY MASS INDEX: 32.21 KG/M2 | HEIGHT: 67 IN

## 2018-02-27 DIAGNOSIS — Z87.891: ICD-10-CM

## 2018-02-27 DIAGNOSIS — Z88.1: ICD-10-CM

## 2018-02-27 DIAGNOSIS — F32.9: ICD-10-CM

## 2018-02-27 DIAGNOSIS — M17.12: Primary | ICD-10-CM

## 2018-02-27 DIAGNOSIS — M21.162: ICD-10-CM

## 2018-02-27 DIAGNOSIS — F41.9: ICD-10-CM

## 2018-02-27 DIAGNOSIS — E78.00: ICD-10-CM

## 2018-02-27 DIAGNOSIS — Z88.0: ICD-10-CM

## 2018-02-27 DIAGNOSIS — Z79.899: ICD-10-CM

## 2018-02-27 DIAGNOSIS — E66.9: ICD-10-CM

## 2018-02-27 PROCEDURE — 0SRD0J9 REPLACEMENT OF LEFT KNEE JOINT WITH SYNTHETIC SUBSTITUTE, CEMENTED, OPEN APPROACH: ICD-10-PCS | Performed by: ORTHOPAEDIC SURGERY

## 2018-02-27 RX ADMIN — BUPROPION HYDROCHLORIDE SCH MG: 150 TABLET, EXTENDED RELEASE ORAL at 21:20

## 2018-02-27 RX ADMIN — FERROUS GLUCONATE SCH MG: 324 TABLET ORAL at 18:16

## 2018-02-27 RX ADMIN — DOCUSATE SODIUM SCH MG: 100 CAPSULE, LIQUID FILLED ORAL at 21:20

## 2018-02-27 RX ADMIN — SODIUM CHLORIDE SCH MG: 9 INJECTION INTRAMUSCULAR; INTRAVENOUS; SUBCUTANEOUS at 13:09

## 2018-02-27 RX ADMIN — Medication SCH EA: at 10:33

## 2018-02-27 RX ADMIN — DULOXETINE SCH MG: 60 CAPSULE, DELAYED RELEASE PELLETS ORAL at 09:00

## 2018-02-27 RX ADMIN — OXYCODONE HYDROCHLORIDE PRN MG: 5 TABLET ORAL at 17:09

## 2018-02-27 RX ADMIN — OXYCODONE HYDROCHLORIDE PRN MG: 5 TABLET ORAL at 21:19

## 2018-02-27 RX ADMIN — OXYCODONE HYDROCHLORIDE PRN MG: 5 TABLET ORAL at 12:57

## 2018-02-27 RX ADMIN — SODIUM CHLORIDE SCH MG: 9 INJECTION INTRAMUSCULAR; INTRAVENOUS; SUBCUTANEOUS at 23:39

## 2018-02-27 RX ADMIN — ATORVASTATIN CALCIUM SCH MG: 10 TABLET, FILM COATED ORAL at 09:00

## 2018-02-27 RX ADMIN — SODIUM CHLORIDE SCH MG: 9 INJECTION INTRAMUSCULAR; INTRAVENOUS; SUBCUTANEOUS at 18:17

## 2018-02-27 RX ADMIN — FERROUS GLUCONATE SCH MG: 324 TABLET ORAL at 13:01

## 2018-02-27 NOTE — [facility] OPERATIVE REPORT
Operative Report


Operative Date


Feb 27, 2018.





Pre-Operative Diagnosis





Left Knee Degenerative Joint Disease





Post-Operative Diagnosis





Left Knee Degenerative Joint Disease





Procedure(s) Performed





Left Total Knee Arthroplasty





Surgeon


Dr. Martinez





Assistant Surgeon(s)


JEFFREY Bowers





Estimated Blood Loss


75 ml





Findings


severe medial disease and trochlear disease left knee





Fluids


2000cc





Specimens





A. Left Knee Bone and Tissue





Drains


None





Anesthesia Type


MAC Spinal Regional





Complication(s)


none





Disposition


no


Recovery Room / PACU





Indications


This 53 year old white female presented the office with complaints of 

intractable left knee pain.  She had tried conservative care measures including 

oral pain medication, viscous supplementation, cortisone injections, and 

activity modification without improvement.  He previously had left knee 

arthroscopy with debridement in August 2017 that showed considerable arthritic 

disease.  She she elected to proceed with surgical intervention in hopes of 

alleviating her discomfort.  Preoperative imaging was obtained.





Description of Procedure


Patient was administered a spinal anesthetic and then taken to the operating 

room where she was given sedation.  She was prepped and draped in usual sterile 

fashion.  Please see Dr. Martinez's operative report for specifics of the 

procedure.  I was present for the entire case from initial patient positioning 

through final wound closure.  Assistance was provided in tissue traction, 

hemostasis, trial implant placement, final implant placement, and final wound 

closure.  Patient was taken to the recovery room in satisfactory condition.


I attest to the content of the Intraoperative Record and any orders documented 

therein.  Any exceptions are noted below.

## 2018-02-27 NOTE — ANESTHESIOLOGY PROGRESS NOTE
Anesthesia Post Op Note


Date & Time


Feb 27, 2018 at 09:17





Vital Signs


Pain Intensity:  0





Vital Signs Past 12 Hours








  Date Time  Temp Pulse Resp B/P (MAP) Pulse Ox O2 Delivery O2 Flow Rate FiO2


 


2/27/18 09:12  65 14  100   


 


2/27/18 09:12 36.7 65 14     


 


2/27/18 09:11    140/88    


 


2/27/18 09:07  68 12  100   


 


2/27/18 09:07  68 12     


 


2/27/18 09:06    132/89    


 


2/27/18 09:02  67 10     


 


2/27/18 09:02  66 10 138/87 100   


 


2/27/18 08:57  64 14  100   


 


2/27/18 08:57  63 14     


 


2/27/18 08:56    146/89    


 


2/27/18 08:52  67 15     


 


2/27/18 08:52  67 15  100   


 


2/27/18 08:51    140/90    


 


2/27/18 08:50      Nasal Cannula 3 


 


2/27/18 08:47  68 13     


 


2/27/18 08:47  67 13  100   


 


2/27/18 08:46    133/88    


 


2/27/18 08:42  73 13  100   


 


2/27/18 08:42  74 13     


 


2/27/18 08:41    137/80    


 


2/27/18 08:37  68 14     


 


2/27/18 08:37  67 14  100   


 


2/27/18 08:36    141/87    


 


2/27/18 08:35    135/98    


 


2/27/18 08:27 37 71 16 129/88 100 Oxymask 7 


 


2/27/18 05:35 36.7 73 18 154/95 98 Room Air  











Notes


Mental Status:  alert / awake / arousable, participated in evaluation


Pt Amnestic to Procedure:  Yes


Nausea / Vomiting:  adequately controlled


Pain:  adequately controlled


Airway Patency, RR, SpO2:  stable & adequate


BP & HR:  stable & adequate


Hydration State:  stable & adequate


Neuraxial Anesthesia:  was administered, sensory block is resolving


Anesthetic Complications:  no major complications apparent

## 2018-02-27 NOTE — ANESTHESIOLOGY PROGRESS NOTE
Anesthesia Post Op Note


Date & Time


Feb 27, 2018 at 09:16





Vital Signs


Pain Intensity:  0





Vital Signs Past 12 Hours








  Date Time  Temp Pulse Resp B/P (MAP) Pulse Ox O2 Delivery O2 Flow Rate FiO2


 


2/27/18 09:12  65 14  100   


 


2/27/18 09:12 36.7 65 14     


 


2/27/18 09:11    140/88    


 


2/27/18 09:07  68 12  100   


 


2/27/18 09:07  68 12     


 


2/27/18 09:06    132/89    


 


2/27/18 09:02  67 10     


 


2/27/18 09:02  66 10 138/87 100   


 


2/27/18 08:57  64 14  100   


 


2/27/18 08:57  63 14     


 


2/27/18 08:56    146/89    


 


2/27/18 08:52  67 15     


 


2/27/18 08:52  67 15  100   


 


2/27/18 08:51    140/90    


 


2/27/18 08:47  68 13     


 


2/27/18 08:47  67 13  100   


 


2/27/18 08:46    133/88    


 


2/27/18 08:42  73 13  100   


 


2/27/18 08:42  74 13     


 


2/27/18 08:41    137/80    


 


2/27/18 08:37  68 14     


 


2/27/18 08:37  67 14  100   


 


2/27/18 08:36    141/87    


 


2/27/18 08:35    135/98    


 


2/27/18 08:27 37 71 16 129/88 100 Oxymask 7 


 


2/27/18 05:35 36.7 73 18 154/95 98 Room Air  











Notes


Mental Status:  alert / awake / arousable, participated in evaluation


Pt Amnestic to Procedure:  Yes


Nausea / Vomiting:  adequately controlled


Pain:  adequately controlled


Airway Patency, RR, SpO2:  stable & adequate


BP & HR:  stable & adequate


Hydration State:  stable & adequate


Anesthetic Complications:  no major complications apparent

## 2018-02-27 NOTE — OPERATIVE REPORT
DATE OF OPERATION:  02/27/2018

 

SURGEON:  Dr. Martinez.

 

ASSISTANT:  Zachariah López PA-C.

 

SECOND ASSISTANT:  Josh, medical student.

 

PREOPERATIVE DIAGNOSIS:  Osteoarthritis, left knee.

 

POSTOPERATIVE DIAGNOSIS:  Same.

 

OPERATION PERFORMED:  Cemented left total knee replacement.

 

PERIOPERATIVE SITUATION:  Medically cleared female with intractable knee

pain, has failed conservative management including arthroscopic debridement

of her left knee.  Continues to have grade 4 disease with marked synovial

reaction, synovitis and loose articular debris.  At this point in time, she

is requesting total knee replacement.

 

PROCEDURE:  The patient appropriately identified, site verified, consent

verified, 2 grams of Ancef confirmed as being given.  The left lower

extremity was prepped and draped in usual routine fashion.  There was a

moderate effusion.  Tourniquet inflated to 300 mmHg after exsanguination of

limb with a rubber Esmarch bandage for a total of approximately 52 minutes. 

Midline exposure utilized.  Parapatellar arthrotomy performed.  Synovectomy

completed.  There were grade 4 changes in the trochlea and in the entire

medial compartment.  The osteophytes were resected, distal femur then entered

with a drill bit, cruciates resected, the tibia subluxated, the medial and

lateral meniscus resected.  Distal femur resected 12 mm, proximal tibia

resected 4 mm, the extension gap was excellent.  Femur was sized to a 3,

appropriate cutting block applied, and the anterior and posterior condylar

and chamfer cuts made.  The tibia was then subluxated and sized to a 3, and

appropriate reaming and broaching carried out.  The size 3 trial with a 12

spacer had excellent stability and excellent tracking of the patella.  The

patella was then incised roughly at 38 and resection made leaving 16 mm, the

seating holes made, and the 38 trial tracked well.

 

The wound was then injected with Orthomix.  All trial implants were then

removed.  The wound irrigated with Betadine, Pulsavac, then dried and then

the permanent cemented into position.  After 12 minutes, the tourniquet

deflated.  Minor bleeding points controlled with electrocautery.  After 14

minutes, the knee flexed.  The trial spacer removed.  The knee irrigated with

Betadine, Pulsavac, Betadine, and then the permanent liner seated, knee

reduced and closed with #2 Vicryl, #1 Vicryl, 2-0 Vicryl and stainless steel

clips.  Appropriate dressing applied.  The patient transferred to recovery

room in satisfactory condition, having tolerated the procedure well. 

Estimated blood loss was 75 mL.  Crystalloid 2000 mL.  Bone pathology

pending.  Orthomix injected.  Two bags of Palacos G cement.

 

SUMMARY OF IMPLANTS:  Size 3 femur posterior cruciate substituting, size 3

rotating platform tibial keel tray, size 3 posterior cruciate substituting

oval domed 3 pegged patella.  Two bags of Palacos G cement.

 

DVT prophylaxis, Coumadin.

 

 

I attest to the content of the Intraoperative Record and any orders documented therein. Any exception
s are noted below.

## 2018-02-27 NOTE — [facility] POST OPERATIVE BRIEF NOTE
Immediate Operative Summary


Operative Date


Feb 27, 2018.





Pre-Operative Diagnosis





Left Knee Degenerative Joint Disease





Post-Operative Diagnosis





Left Knee Degenerative Joint Disease





Procedure(s) Performed





Left Total Knee Arthroplasty





Surgeon


Dr. Martinez





Assistant Surgeon(s)


JEFFREY Canales





Estimated Blood Loss


75 ml





Findings


Consistent with Post-Op Diagnosis


severe medial disease and trochlear disease





Fluids (cc crystalloids)


2000cc





Specimens





A. Left Knee Bone and Tissue





Drains


None





Anesthesia Type


MAC Spinal Regional





Complication(s)


none





Disposition


Accompanied Pt To Recover:  no


Disposition:  Recovery Room / PACU

## 2018-02-27 NOTE — PROGRESS NOTE
DATE: 02/27/2018

 

Postop check status post left total knee replacement.  At this point in time,

the patient is doing well, is eating.  She denies chest pain, shortness of

breath, fever, chills, nausea, vomiting or headache.

 

Vital signs are stable.  She is afebrile.

 

Neurovascular check, femoral sciatic nerve is normal.  Wound clean and dry.

 

Postop x-rays look excellent.

 

ASSESSMENT:  Doing well.  Continue care pathway.  Discharge tomorrow after

PT/OT in the morning.  I will be out of town and she will be taken care of by

Zachariah Guido PA-C.

## 2018-02-27 NOTE — DIAGNOSTIC IMAGING REPORT
L KNEE 1 OR 2 VIEWS ROUTINE



CLINICAL HISTORY: 53 years-old Female presenting with AP/LATERAL IN PACU LEFT

KNEE. 



TECHNIQUE: Frontal and lateral views of the left knee were obtained. 



COMPARISON: 11/20/2017.



FINDINGS:

Post surgical changes of total left knee arthroplasty with patellar resurfacing.

Expected intra-articular and soft tissue emphysema. Overlying skin staples

noted. No malalignment. No periprosthetic fracture. No hardware convocation.

Osteopenia may be present.



IMPRESSION:

Expected postsurgical findings status post total left knee arthroplasty with

patellar resurfacing.







Electronically signed by:  Lew Villagomez M.D.

2/27/2018 9:03 AM



Dictated Date/Time:  2/27/2018 8:58 AM

## 2018-02-27 NOTE — HISTORY & PHYSICAL BRIDGE NOTE
H&P Re-Evaluation


Bridge Note:


I have examined the patient, reviewed the History & Physical and in the 

interval since the performance of the History & Physical I have noted the 

following changes of clinical significance: consent obtained all questions 

answered.No changes noted

## 2018-02-28 VITALS
SYSTOLIC BLOOD PRESSURE: 112 MMHG | TEMPERATURE: 97.88 F | OXYGEN SATURATION: 100 % | DIASTOLIC BLOOD PRESSURE: 69 MMHG | HEART RATE: 70 BPM

## 2018-02-28 VITALS
DIASTOLIC BLOOD PRESSURE: 68 MMHG | HEART RATE: 70 BPM | TEMPERATURE: 98.42 F | SYSTOLIC BLOOD PRESSURE: 114 MMHG | OXYGEN SATURATION: 99 %

## 2018-02-28 VITALS
OXYGEN SATURATION: 99 % | TEMPERATURE: 98.42 F | DIASTOLIC BLOOD PRESSURE: 68 MMHG | SYSTOLIC BLOOD PRESSURE: 114 MMHG | HEART RATE: 70 BPM

## 2018-02-28 VITALS — OXYGEN SATURATION: 99 %

## 2018-02-28 LAB
BUN SERPL-MCNC: 16 MG/DL (ref 7–18)
CALCIUM SERPL-MCNC: 8.1 MG/DL (ref 8.5–10.1)
CO2 SERPL-SCNC: 30 MMOL/L (ref 21–32)
CREAT SERPL-MCNC: 0.73 MG/DL (ref 0.6–1.2)
EOSINOPHIL NFR BLD AUTO: 229 K/UL (ref 130–400)
GLUCOSE SERPL-MCNC: 106 MG/DL (ref 70–99)
HCT VFR BLD CALC: 26 % (ref 37–47)
HGB BLD-MCNC: 8.6 G/DL (ref 12–16)
INR PPP: 1 (ref 0.9–1.1)
MCH RBC QN AUTO: 29.5 PG (ref 25–34)
MCHC RBC AUTO-ENTMCNC: 33.1 G/DL (ref 32–36)
MCV RBC AUTO: 89 FL (ref 80–100)
PMV BLD AUTO: 8.9 FL (ref 7.4–10.4)
POTASSIUM SERPL-SCNC: 3.6 MMOL/L (ref 3.5–5.1)
RED CELL DISTRIBUTION WIDTH CV: 12.9 % (ref 11.5–14.5)
RED CELL DISTRIBUTION WIDTH SD: 41.5 FL (ref 36.4–46.3)
SODIUM SERPL-SCNC: 137 MMOL/L (ref 136–145)
WBC # BLD AUTO: 7.14 K/UL (ref 4.8–10.8)

## 2018-02-28 RX ADMIN — OXYCODONE HYDROCHLORIDE PRN MG: 5 TABLET ORAL at 02:00

## 2018-02-28 RX ADMIN — Medication SCH EA: at 10:01

## 2018-02-28 RX ADMIN — SODIUM CHLORIDE SCH MG: 9 INJECTION INTRAMUSCULAR; INTRAVENOUS; SUBCUTANEOUS at 05:38

## 2018-02-28 RX ADMIN — OXYCODONE HYDROCHLORIDE PRN MG: 5 TABLET ORAL at 08:47

## 2018-02-28 RX ADMIN — ATORVASTATIN CALCIUM SCH MG: 10 TABLET, FILM COATED ORAL at 08:42

## 2018-02-28 RX ADMIN — DOCUSATE SODIUM SCH MG: 100 CAPSULE, LIQUID FILLED ORAL at 09:05

## 2018-02-28 RX ADMIN — DULOXETINE SCH MG: 60 CAPSULE, DELAYED RELEASE PELLETS ORAL at 08:43

## 2018-02-28 RX ADMIN — BUPROPION HYDROCHLORIDE SCH MG: 150 TABLET, EXTENDED RELEASE ORAL at 08:42

## 2018-02-28 RX ADMIN — FERROUS GLUCONATE SCH MG: 324 TABLET ORAL at 08:43

## 2018-02-28 NOTE — DISCHARGE SUMMARY
CHIEF COMPLAINT:  Left knee pain.

 

HISTORY OF PRESENT ILLNESS:  A 53-year-old female who underwent elective left

total knee replacement for significant articular softening medial compartment

with grade 4 changes there.  She has failed conservative management including

arthroscopic debridement over the last 24 months.

 

X-rays reveal medial joint space narrowing, a slight varus deformity. 

Arthroscopic pictures revealed grade 4 disease.

 

PAST MEDICAL HISTORY:  Remarkable for cholesterolemia, anxiety,

osteoarthritis, chronic low back pain, and obesity.

 

PAST SURGICAL HISTORY:  Include trigger thumb releases, tubal ligation,

pneumothorax, history of a chest tube placement in 1995, ovarian cyst

excision, debridement of her left knee in August of 2017 with no success.

 

ALLERGIES:  PENICILLIN, WHICH CAUSES RANDOM JOINT SWELLING; LEVAQUIN, WHICH

CAUSES HIVES.  She does fine with Ancef and Keflex.

 

PREADMISSION MEDICATIONS:  Include Cymbalta, Lipitor, trazodone, Voltaren gel

and Wellbutrin.  She will continue all those medications with the exception

of the Voltaren gel and add Coumadin to keep INR 1.8-2.2, start with 4 mg

daily.  Check INR on Monday.

 

SOCIAL HISTORY:  Reveals she lives with her  and her daughter.  She

quit smoking.  She is a former cook.  Social drinking.  She is .

 

REVIEW OF SYSTEMS:  Noncontributory.

 

HOSPITAL COURSE:  Has been uneventful.  She has done well.  She has walked

several hundred feet.  She is very happy.  Her pain is well managed.

 

ASSESSMENT AND PLAN:  Status post left total knee replacement.  We will

discharge to home today.  Follow up in 2 weeks for staple removal.  INR

1.8-2.2 with 4 mg of Coumadin, to start daily now.  Check INR on Monday.

## 2018-02-28 NOTE — CLINICAL DOCUMENTATION QUERY
AKHIL Oconnell :



**** CLINICAL DOCUMENTATION QUERY****

Patient is a 53 year old female who on 2/27 underwent right TKA.  Preoperative H&H was 
11.5 g/dl and 34.2%.  POD #1, repeat values were 8.6 g/dl and 26%.  Operative EBL was 75 
ml's.  Net I/O is positive for 1,136 ml's at this time.  She is being monitored with 
hematology and I/O.  As appropriate, consider documentation as suggested below.  Thank 
you. 



In your clinical opinion is this patient being managed for:

    

                        (x ) Acute blood loss and hemodilutional anemia

                        (  ) Not Agree



                        (  ) Other explanation of clinical findings (Please Explain)

                        (  ) Unable to determine (Please Define)

                        (  ) Need to Discuss

                        



The medical record reflects the following clinical findings, treatment, and risk factors.  
  



Clinical Indicators: As above

Treatment:She is being monitored with hematology and I/O

Risk Factors: Acute perioperative blood loss and IVF administration.



Please clarify and document your clinical opinion in the progress notes and discharge 
summary. Terms such as "probable", "suspected", "likely", "questionable", "possible", or 
"still to be ruled out" are acceptable. 



*****IF IN AGREEMENT, YOU MUST DOCUMENT ABOVE DIAGNOSTIC STATEMENT IN DAILY PROGRESS NOTES 
AND DISCHARGE SUMMARY. This document is not part of the patient's record.*****

Thank You, Fausto Cordova, GLORY [phone]

## 2018-02-28 NOTE — ORTHOPEDIC PROGRESS NOTE
Orthopedic Progress Note


Date of Service


Feb 28, 2018.





Subjective


Post OP Day:  1


Reports: feeling well, pain controlled w PO medications, Denies: complaints, 

chest pain, SOB, nausea / vomiting, light headedness, calf pain


Additional Notes:


States she is ready to go home, feels well. Knee hurts less now than it did pre-

op.





Objective


calves soft nontender, N/V intact, capillary refill less than 2 sec., dressing C

/D/I, incision C/D/I, A&O x3, toes mobile, CMS intact


Scant drainage on dressings, no active bleeding, expected post op edema. Able 

to flex knee and do a SLR.











  Date Time  Temp Pulse Resp B/P (MAP) Pulse Ox O2 Delivery O2 Flow Rate FiO2


 


2/28/18 08:43     99 Room Air  


 


2/28/18 08:01 36.9 70 14 114/68 (83) 99 Room Air  


 


2/28/18 07:45      Room Air  


 


2/28/18 03:36 36.6 70 15 112/69 (83) 100 Room Air  


 


2/27/18 23:38 36.9 70 15 129/80 (96) 100 Room Air  


 


2/27/18 23:37      Room Air  


 


2/27/18 19:35 36.8 77 18 98/60 (73) 99 Room Air  


 


2/27/18 15:35      Room Air  


 


2/27/18 15:17 37.0 75 18 127/80 (96) 97 Room Air  


 


2/27/18 12:25  70 18 110/74 (86) 100 Nasal Cannula 2.0 


 


2/27/18 11:25  78 18 104/66 (79) 100 Nasal Cannula 2.0 


 


2/27/18 10:25  73 18 123/85 (98) 100 Nasal Cannula 2.0 


 


2/27/18 09:52  63 18 144/90 (108) 100 Nasal Cannula 3.0 








Laboratory Results 24 Hours:











Test


  2/28/18


05:54


 


Hematocrit 26.0 % 


 


Hemoglobin 8.6 g/dL 


 


Prothromb Time International


Ratio 1.0 


 


 


Prothrombin Time 10.8 SECONDS 











Assessment & Plan


Assessment:


Left knee post op day 1 total knee arthroplasty


Plan:


PT/OT today then D/C to home


She will arrange outpt PT for monday


coumadin per nomogram. Dose today before D/C 


dressing changed this morning by me-wound looks excellent. 


Ambulating with walker


continue TEDs after D/C for 6 weeks








Discharge Planning


Discharge Planning:  home


Pain Management:  Percocet


DVT Prophylaxis:  TEDs, SCDs, Coumadin


Therapy:  Physical Therapy

## 2018-02-28 NOTE — DISCHARGE INSTRUCTIONS
Discharge Instructions


Date of Service


2018.





Admission


Reason for Admission:  Left Knee Degenerative Joint Disease





Discharge


Discharge Diagnosis / Problem:  Left knee s/p total knee replacement





Discharge Goals


Goal(s):  Decrease discomfort, Improve function, Increase independence





Activity Recommendations


Activity Limitations:  as noted below


Lifting Limitations:  gradually increase as tolerated


Exercise/Sports Limitations:  until after follow-up appointment


Shower/Bathe:  keep incision dry


Driving or Machine Use:  No driving until cleared by Dr. Martinez


Weightbearing Status:  Left weightbearing (as tolerated)





.





Instructions / Follow-Up


Instructions / Follow-Up





New Medicine:  





*  You will likely be taking one or more of these medications:


     


   1.  Percocet - Take, as directed, when you need it, every


        four to six hours to control your pain.


   2.  Coumadin - Thins your blood to lessen the chance of forming a blood clot.


        The dose of this is different for each person and is based on your 

blood 


        tests that are done twice a week.





*  The most common side effects of pain medicine and iron are nausea and 

constipation.


    If nausea or constipation is too much of a problem or if you have any 

questions about


    your new medicines or doses, call [facility] at [phone].  

We


    will try to help you manage these issues.








VERY IMPORTANT TO READ AND REVIEW"





Blood Clots and Blood Thinning Medicine:





*  You are given Coumadin during the immediate post-operative period to lessen 

the risk of


    blood clots forming in your legs and/or lungs.  Coumadin is usually given 

for six weeks after


    surgery.


*  The prescription is for 2 mg tablets.  At discharge, you should understand 

your dose and 


    take it all at the same time every day, preferably after dinner.   


*  You need to get your blood checked 1 - 2 times per week for six weeks or as 

directed. 


*  If your dose needs to change, we will call you. Do not take your medication 

on the day of the blood


    test until we call you.





Pain:





*  The immediate post-operative period after knee replacement surgery is often 

quite painful.


*  You are given a prescription for pain medicine.  You should take it, as 

directed, when you 


    need it, especially before physical therapy and before going to bed.  Pain 

that interferes


    with sleep is very common and can last several months.


*  You will likely need pain medicine for the first four to six weeks.  It will 

not stop all of the


    pain.  The pain will lessen and as you feel better, you may change to 

milder pain medicine


    such as Tylenol.  


*  The most common side effects of pain medicine are nausea and constipation, 

so don't take


    more than you need.








Physical Therapy:





*  You will have physical therapy two or three times each week for four to six 

weeks after


    your surgery in order to regain your knee range of motion and to retrain 

your knee


    to work properly.  


*  It is just as important to make sure you are getting your knee perfectly 

straight as


    it is to regain your knee bend.


*  Taking a pain pill an hour before therapy can help you have a more 

productive and 


    comfortable therapy session if needed.





Home Exercise:





*  You were shown a series of exercises (heel props, heel slides, etc.) in the 

hospital.


    Do these exercises three to four times each day including the exercises you 

were 


    shown in physical therapy.





Walking:





*  Get up and walk several times each day.  For the first four weeks, try not 

to stand or


    walk for more than one hour at a time.  If you do stand or walk for more 

than one hour,


    you will not hurt anything, but your knee and leg will likely swell.  


*  As you feel comfortable, you may change from the walker or crutches to a 

cane and 


    then to independent walking.





SELF CARE INSTRUCTIONS AFTER TOTAL KNEE REPLACEMENT





A.  You may need to continue a physical therapy program after discharge from


     the hospital.  There are several options available to you.  Your doctor 

will


      assist you in selecting the best one for you.


   1.  An out-patient facility 2 to 3 times a week for therapy or home therapy.


   2.  Continue working on all exercises taught to you in the hospital.  Your


                  goals should be to increase bending of your knee to 90 

degrees and


              beyond and to fully straighten your knee.





B.  You may progress at your own pace from walking with a walker or crutches


      to a cane; then to no assistive devices.





C.  Make walking a part of your daily routine.  Be up as much as comfortable 


     with rest periods throughout the day.  Rest with leg elevation is very 

important.


      Use the ice wrap frequently for the first 3-4 weeks.





D.  There are no restrictions on activities.  You may ride in a car, shop, 

participate


      in household chores and all social activities.





E.  Wear the long elastic stockings (JAN hose) 20 hours a day for six weeks 

after surgery.  


     They can be removed several times a day for laundering and for a shower.





F.  Do not place a pillow behind your knee when resting.  A pillow at your 

ankle is okay.








**VERY IMPORTANT TO READ AND REVIEW**





A.  Take Coumadin, Aspirin or Lovenox (blood thinning medications) as directed 

by your


     doctor.  If on Coumadin, have a pro-time (blood test) drawn according to 

your doctor's 


     instructions.  This will tell the doctor how well the Coumadin is thinning 

your blood.





   1.  YOU WILL BE GIVEN AN ORDER AT DISCHARGE FOR PT/INR (BLOOD WORK).  


        PLEASE HAVE THIS DONE AS INSTRUCTED.  PLEASE CALL OUR OFFICE


        AFTER YOUR BLOODWORK IS COMPLETE SO WE CAN TRACK YOUR RESULTS.  


        IF YOU ARE GOING TO OUTPATIENT PHYSICAL THERAPY, YOU WILL


        NEED TO GO TO OUTPATIENT TESTING TO HAVE IT DRAWN.





B.  There are a few signs you need to watch for after you are home.  Call 


     [facility] if you notice any of the followin.  Increased severe knee pain.  Some pain is expected especially


              when you exercise.


   2.  Increased swelling in your leg or knee; pain or swelling of the


              calf muscle in either lower leg.


   3.  Any fluid drainage from the incision.


   4.  Shortness of breath or chest pain.





C.  Please call [facility] at [phone] if you have any 


     concerns or questions about your operation or recovery.  The doctor or his 


     nurse will return your call promptly.





D.  You must take antibiotics before dental work, bladder, bowel or other 

surgery.  


     Call the office to obtain a prescription at least 2 days prior to your 

appointment.





*  CALL IF INCREASED PAIN, REDNESS, DRAINAGE OR FEVER GREATER THAT 101.





*  Sutures should be removed 12-14 days after surgery unless you are on chronic 


    steriods, then it will be 14-18 days after surgery.





Call your doctor if:





*  Temperature above 101 degrees F.


*  Pain not relieved by pain medicine ordered.


*  Increased drainage or redness from incision.


*  Notify your doctor with any questions or concerns.





Current Hospital Diet


Patient's current hospital diet: Regular Diet





Discharge Diet


Recommended Diet:  Regular Diet





Procedures


Procedures Performed:  


Left Total Knee Arthroplasty





Pending Studies


Studies pending at discharge:  no





Medical Emergencies








.


Who to Call and When:





Medical Emergencies:  If at any time you feel your situation is an emergency, 

please call 911 immediately.





.





Non-Emergent Contact


Non-Emergency issues call your:  Primary Care Provider, Surgeon


Call Non-Emergent contact if:  temperature is above 101, wound has increased 

drainage, wound has increased redness, wound has increased pain, you have any 

medication questions


.








"Provider Documentation" section prepared by Zachariha López PA-C.








.





VTE Core Measure


Inpt VTE Proph given/why not?:  Warfarin (Coumadin), T.E.DWendy Stockings, SCD's





[place] Drug Monitoring Program


Search Results:  no issues identified

## 2018-02-28 NOTE — ANESTHESIOLOGY PROGRESS NOTE
Anesthesia Post Op Note


Date & Time


Feb 28, 2018 at 08:05





Vital Signs


Pain Intensity:  7.0





Vital Signs Past 12 Hours








  Date Time  Temp Pulse Resp B/P (MAP) Pulse Ox O2 Delivery O2 Flow Rate FiO2


 


2/28/18 08:01 36.9 70 14 114/68 (83) 99 Room Air  


 


2/28/18 03:36 36.6 70 15 112/69 (83) 100 Room Air  


 


2/27/18 23:38 36.9 70 15 129/80 (96) 100 Room Air  


 


2/27/18 23:37      Room Air  











Notes


Mental Status:  alert / awake / arousable, participated in evaluation


Pt Amnestic to Procedure:  Yes


Nausea / Vomiting:  adequately controlled


Pain:  adequately controlled


Airway Patency, RR, SpO2:  stable & adequate


BP & HR:  stable & adequate


Hydration State:  stable & adequate


Neuraxial Anesthesia:  was administered, sensory block resolved


Anesthetic Complications:  no major complications apparent

## 2018-04-30 ENCOUNTER — HOSPITAL ENCOUNTER (OUTPATIENT)
Dept: HOSPITAL 45 - C.RDSM | Age: 53
Discharge: HOME | End: 2018-04-30
Attending: ORTHOPAEDIC SURGERY
Payer: COMMERCIAL

## 2018-04-30 DIAGNOSIS — Z96.659: Primary | ICD-10-CM

## 2018-04-30 DIAGNOSIS — M25.562: ICD-10-CM
